# Patient Record
Sex: MALE | Race: WHITE
[De-identification: names, ages, dates, MRNs, and addresses within clinical notes are randomized per-mention and may not be internally consistent; named-entity substitution may affect disease eponyms.]

---

## 2020-05-10 ENCOUNTER — TRANSCRIPTION ENCOUNTER (OUTPATIENT)
Age: 70
End: 2020-05-10

## 2020-10-30 DIAGNOSIS — Z78.9 OTHER SPECIFIED HEALTH STATUS: ICD-10-CM

## 2020-10-30 DIAGNOSIS — Z83.3 FAMILY HISTORY OF DIABETES MELLITUS: ICD-10-CM

## 2020-10-30 DIAGNOSIS — I10 ESSENTIAL (PRIMARY) HYPERTENSION: ICD-10-CM

## 2020-11-02 ENCOUNTER — APPOINTMENT (OUTPATIENT)
Dept: UROLOGY | Facility: CLINIC | Age: 70
End: 2020-11-02
Payer: MEDICARE

## 2020-11-02 VITALS
BODY MASS INDEX: 30.53 KG/M2 | SYSTOLIC BLOOD PRESSURE: 113 MMHG | TEMPERATURE: 98 F | OXYGEN SATURATION: 98 % | DIASTOLIC BLOOD PRESSURE: 75 MMHG | HEART RATE: 74 BPM | WEIGHT: 190 LBS | HEIGHT: 66 IN

## 2020-11-02 DIAGNOSIS — Z87.891 PERSONAL HISTORY OF NICOTINE DEPENDENCE: ICD-10-CM

## 2020-11-02 LAB
BILIRUB UR QL STRIP: NORMAL
CLARITY UR: CLEAR
COLLECTION METHOD: NORMAL
GLUCOSE UR-MCNC: NORMAL
HCG UR QL: 0.2 EU/DL
HGB UR QL STRIP.AUTO: NORMAL
KETONES UR-MCNC: NORMAL
LEUKOCYTE ESTERASE UR QL STRIP: NORMAL
NITRITE UR QL STRIP: NORMAL
PH UR STRIP: 6.5
PROT UR STRIP-MCNC: NORMAL
SP GR UR STRIP: 1.01

## 2020-11-02 PROCEDURE — 76857 US EXAM PELVIC LIMITED: CPT

## 2020-11-02 PROCEDURE — 99215 OFFICE O/P EST HI 40 MIN: CPT | Mod: 25

## 2020-11-02 PROCEDURE — 99072 ADDL SUPL MATRL&STAF TM PHE: CPT

## 2020-11-02 PROCEDURE — 81003 URINALYSIS AUTO W/O SCOPE: CPT | Mod: QW

## 2020-11-02 RX ORDER — LOSARTAN POTASSIUM 50 MG/1
50 TABLET, FILM COATED ORAL
Refills: 0 | Status: ACTIVE | COMMUNITY

## 2020-11-02 NOTE — ASSESSMENT
[FreeTextEntry1] : I discussed the findings and options with Mr. DONIS JAMES in detail and reviewed the renal sonogram and PSA.\par \par Mr. James will continue with the combination therapy for his voiding symptoms.\par \par Providing there are no new problems, I look forward to seeing him in one year (bladder sono, renal sono, PSA). \par

## 2020-11-02 NOTE — REVIEW OF SYSTEMS
[see HPI] : see HPI [Initiating Urination Req. Strain] : initiating urination requires straining [Wake up at night to urinate  How many times?  ___] : wakes up to urinate [unfilled] times during the night [Negative] : Heme/Lymph [FreeTextEntry4] : , frequency

## 2020-11-02 NOTE — ADDENDUM
[FreeTextEntry1] : A portion of this note was written by [Russel Mobley] on 10/30/2020 acting as a scribe for Dr. Marks.\par \par I have personally reviewed the chart and agree that the record accurately reflects my personal performance of the history, physical exam, assessment and plan.

## 2020-11-02 NOTE — HISTORY OF PRESENT ILLNESS
[FreeTextEntry1] : Mr. DONIS JAMES comes in today for his urologic follow-up.  He presents with moderate lower urinary tract symptoms (obstructive and irritative) with nocturia x 3.  He is continuing on Flomax 0.4 mg and Proscar.\par IPSS: 9/35\par Sono: 33cc PVR; 45cc prostate\par \par Mr. James has a 40+ year onset of recurrent urolithiasis and has had 4 ESWL procedures and one ureteroscopy all prior to 2012.  He has been asymptomatic for over 10 years.\par \par He also had a prostate biopsy in 2011 which was complicated by urosepsis.  (Results not available)\par \par Mr James reports normal erections.\par \par PSA: 7/22/20--1.5; 7/15/19--1.7; 6/25/18--1.3; 10/13/17--1.5; 10/14/15--2.0\par \par Renal sono: \par 10/26/20--stable 2.3 cm left lower pole stone.  Bilateral renal cysts.\par 9/27/19--2.3cm left lower pole stone. Bilat renal cysts\par 9/14/12--1.3 cm left and 2 right renal stones (7+4mm)\par \par KUB: 9/27/19--Stable Lt lower pole renal stone (2.3 x 2.1cm)\par 10/5/18--Stable 2.4cm Lt lower pole stone\par \par FISH: 12/3/18--Negative\par Cytology: 10/17/18--Negative; 10/13/17--Atypia

## 2020-11-19 ENCOUNTER — APPOINTMENT (OUTPATIENT)
Dept: ORTHOPEDIC SURGERY | Facility: CLINIC | Age: 70
End: 2020-11-19
Payer: MEDICARE

## 2020-11-19 DIAGNOSIS — M25.561 PAIN IN RIGHT KNEE: ICD-10-CM

## 2020-11-19 PROCEDURE — 99204 OFFICE O/P NEW MOD 45 MIN: CPT | Mod: 25

## 2020-11-19 PROCEDURE — 73562 X-RAY EXAM OF KNEE 3: CPT | Mod: RT

## 2020-11-19 PROCEDURE — 20611 DRAIN/INJ JOINT/BURSA W/US: CPT | Mod: RT

## 2020-11-19 RX ORDER — HYALURONATE SODIUM 30 MG/2 ML
30 SYRINGE (ML) INTRAARTICULAR
Qty: 3 | Refills: 0 | Status: ACTIVE | OUTPATIENT
Start: 2020-11-19

## 2020-11-19 NOTE — PHYSICAL EXAM
[de-identified] : Right knee definite medial joint line tenderness is mild varus inclination range of motion is 5-125°. Quad tone is good no evidence of any instability. Neurovascular intact distally. [de-identified] : AP standing and lateral sunrise views were obtained showing near complete cartilage loss in the medial compartment of the right knee on the standing AP projections.

## 2020-11-19 NOTE — PROCEDURE
[de-identified] : Patient was given a cortisone injection into the lateral compartment of the right knee today. This demonstrated a condition that ultrasound guidance. Patient tolerated the procedure well.

## 2020-11-19 NOTE — DISCUSSION/SUMMARY
[de-identified] : We talked about options patient would like to proceed with Orthovisc injections we'll order them and notify them once they are available for use. We'll be touched upon the subject of possible knee replacement surgery in the future if conservative measures fail

## 2020-11-19 NOTE — HISTORY OF PRESENT ILLNESS
[de-identified] : First time visit for this patient is seen with complaint of right knee pain. Apparently he had similar pain in his left knee a few years ago and was given a cortisone injection and Orthovisc injections by another physician and has been well ever since. Right knee pain is ongoing for at least 3 months she recalls no specific accident injury but now getting in and out of the seated position as well as going up and down stairs quite painful.

## 2021-02-12 ENCOUNTER — APPOINTMENT (OUTPATIENT)
Dept: ORTHOPEDIC SURGERY | Facility: CLINIC | Age: 71
End: 2021-02-12

## 2021-04-01 ENCOUNTER — APPOINTMENT (OUTPATIENT)
Dept: ORTHOPEDIC SURGERY | Facility: CLINIC | Age: 71
End: 2021-04-01
Payer: MEDICARE

## 2021-04-01 PROCEDURE — 99214 OFFICE O/P EST MOD 30 MIN: CPT | Mod: 25

## 2021-04-01 PROCEDURE — 99072 ADDL SUPL MATRL&STAF TM PHE: CPT

## 2021-04-01 PROCEDURE — 20611 DRAIN/INJ JOINT/BURSA W/US: CPT | Mod: RT

## 2021-04-01 NOTE — PROCEDURE
[de-identified] : Injection 1/3\par Patient was given an Euflexxa injection 30mg/2ml  in the lateral compartment of the knee. Injection is performed under sterile conditions with ultrasound guidance. Patient tolerated procedure well. \par \par

## 2021-04-01 NOTE — PHYSICAL EXAM
[de-identified] : Right knee definite medial joint line tenderness is mild varus inclination range of motion is 5-125°. Quad tone is good no evidence of any instability. Neurovascular intact distally. \par

## 2021-04-08 ENCOUNTER — APPOINTMENT (OUTPATIENT)
Dept: ORTHOPEDIC SURGERY | Facility: CLINIC | Age: 71
End: 2021-04-08
Payer: MEDICARE

## 2021-04-08 PROCEDURE — 99072 ADDL SUPL MATRL&STAF TM PHE: CPT

## 2021-04-08 PROCEDURE — 20611 DRAIN/INJ JOINT/BURSA W/US: CPT | Mod: RT

## 2021-04-08 PROCEDURE — 99214 OFFICE O/P EST MOD 30 MIN: CPT | Mod: 25

## 2021-04-08 NOTE — HISTORY OF PRESENT ILLNESS
[de-identified] : Patient is here for a second of 3 right knee flexor injections. He states he feels some mild improvement already.

## 2021-04-08 NOTE — PROCEDURE
[de-identified] : Patient was given the second of a series of 3 units Euflexxa injections today. This was done under sterile conditions ultrasound guidance patient tolerated the procedure well

## 2021-04-08 NOTE — PHYSICAL EXAM
[de-identified] : Right knee definite medial joint line tenderness is mild varus inclination range of motion is 5-125°. Quad tone is good no evidence of any instability. Neurovascular intact distally.

## 2021-04-15 ENCOUNTER — APPOINTMENT (OUTPATIENT)
Dept: ORTHOPEDIC SURGERY | Facility: CLINIC | Age: 71
End: 2021-04-15
Payer: MEDICARE

## 2021-04-15 PROCEDURE — 99214 OFFICE O/P EST MOD 30 MIN: CPT | Mod: 25

## 2021-04-15 PROCEDURE — 99072 ADDL SUPL MATRL&STAF TM PHE: CPT

## 2021-04-15 PROCEDURE — 20611 DRAIN/INJ JOINT/BURSA W/US: CPT | Mod: RT

## 2021-04-15 NOTE — HISTORY OF PRESENT ILLNESS
[de-identified] : Returns today for her third and final right knee Euflexxa injection patient states he feels moderate improvement thus far.

## 2021-04-15 NOTE — PHYSICAL EXAM
[de-identified] : Right knee definite medial joint line tenderness is mild varus inclination range of motion is 5-125°. Quad tone is good no evidence of any instability. Neurovascular intact distally.

## 2021-04-15 NOTE — PROCEDURE
[de-identified] : Patient was given the third of a series of 3 units Euflexxa injections today. This was done under sterile conditions ultrasound guidance patient tolerated the procedure well

## 2021-05-20 DIAGNOSIS — M17.11 UNILATERAL PRIMARY OSTEOARTHRITIS, RIGHT KNEE: ICD-10-CM

## 2021-05-20 RX ORDER — HYALURONATE SODIUM 20 MG/2 ML
20 SYRINGE (ML) INTRAARTICULAR
Qty: 3 | Refills: 0 | Status: ACTIVE | OUTPATIENT
Start: 2021-05-20

## 2021-10-22 ENCOUNTER — RX RENEWAL (OUTPATIENT)
Age: 71
End: 2021-10-22

## 2021-11-10 ENCOUNTER — APPOINTMENT (OUTPATIENT)
Dept: UROLOGY | Facility: CLINIC | Age: 71
End: 2021-11-10
Payer: MEDICARE

## 2021-11-10 PROCEDURE — 99214 OFFICE O/P EST MOD 30 MIN: CPT

## 2021-11-10 PROCEDURE — 51798 US URINE CAPACITY MEASURE: CPT

## 2021-11-11 NOTE — ASSESSMENT
[FreeTextEntry1] : I discussed the findings and options with Mr. DONIS JAMES in detail.  He is satisfied with his urinary symptoms and will simply continue with the tamsulosin 0.4mg and finasteride 5mg.  I also reviewed behavioral modification. \par \par A renal sonogram was ordered and I will call Mr. James with the result. \par \par Providing there are no new problems, I look forward to seeing Mr. James in one year (bladder sono, renal sono, PSA).

## 2021-11-11 NOTE — HISTORY OF PRESENT ILLNESS
[FreeTextEntry1] : Mr. DONIS JAMES comes in today for his annual urologic follow-up.  He presents with moderate lower urinary tract symptoms (obstructive and irritative) with nocturia x 3.  He is continuing on Flomax 0.4 mg and Proscar.\par IPSS: 18/35\par Sono: 152cc PVR\par \par Mr. James has a 40+ year onset of recurrent urolithiasis and has had 4 ESWL procedures and one ureteroscopy all prior to 2012.  He has been asymptomatic for over 10 years.\par \par He also had a prostate biopsy in 2011 which was complicated by urosepsis.  (Results not available)\par \par Mr James reports normal erections.\par \par PSA: 7/21/21--1.4; 7/22/20--1.5; 7/15/19--1.7; 6/25/18--1.3; 10/13/17--1.5; 10/14/15--2.0\par \par Renal sono: 10/26/20--stable 2.3 cm left lower pole stone.  Bilateral renal cysts; 9/27/19--2.3cm left lower pole stone. Bilat renal cysts; 9/14/12--1.3 cm left and 2 right renal stones (7+4mm)\par \par KUB: 9/27/19--Stable Lt lower pole renal stone (2.3 x 2.1cm); 10/5/18--Stable 2.4cm Lt lower pole stone\par \par FISH: 12/3/18--Negative\par Cytology: 10/17/18--Negative; 10/13/17--Atypia

## 2021-11-11 NOTE — PHYSICAL EXAM
[General Appearance - Well Developed] : well developed [General Appearance - Well Nourished] : well nourished [Normal Appearance] : normal appearance [Well Groomed] : well groomed [General Appearance - In No Acute Distress] : no acute distress [Abdomen Soft] : soft [Abdomen Tenderness] : non-tender [Costovertebral Angle Tenderness] : no ~M costovertebral angle tenderness [Urethral Meatus] : meatus normal [Urinary Bladder Findings] : the bladder was normal on palpation [Scrotum] : the scrotum was normal [Testes Mass (___cm)] : there were no testicular masses [No Prostate Nodules] : no prostate nodules [Edema] : no peripheral edema [] : no respiratory distress [Respiration, Rhythm And Depth] : normal respiratory rhythm and effort [Exaggerated Use Of Accessory Muscles For Inspiration] : no accessory muscle use [Oriented To Time, Place, And Person] : oriented to person, place, and time [Affect] : the affect was normal [Mood] : the mood was normal [Not Anxious] : not anxious [Normal Station and Gait] : the gait and station were normal for the patient's age [No Focal Deficits] : no focal deficits [No Palpable Adenopathy] : no palpable adenopathy [Abdomen Mass (___ Cm)] : no abdominal mass palpated [Abdomen Hernia] : no hernia was discovered [Penis Abnormality] : normal uncircumcised penis [Epididymis] : the epididymides were normal [Testes Tenderness] : no tenderness of the testes [Prostate Tenderness] : the prostate was not tender [Skin Color & Pigmentation] : normal skin color and pigmentation [FreeTextEntry1] : Bilateral hydroceles

## 2021-11-11 NOTE — ADDENDUM
[FreeTextEntry1] : A portion of this note was written by [Michael Sow] on 11/09/2021 acting as a scribe for Dr. Marks. \par \par I have personally reviewed the chart and agree that the record accurately reflects my personal performance of the history, physical exam, assessment, and plan.

## 2021-11-30 ENCOUNTER — NON-APPOINTMENT (OUTPATIENT)
Age: 71
End: 2021-11-30

## 2022-11-16 ENCOUNTER — LABORATORY RESULT (OUTPATIENT)
Age: 72
End: 2022-11-16

## 2022-11-16 ENCOUNTER — APPOINTMENT (OUTPATIENT)
Dept: UROLOGY | Facility: CLINIC | Age: 72
End: 2022-11-16

## 2022-11-16 PROCEDURE — 99214 OFFICE O/P EST MOD 30 MIN: CPT

## 2022-11-16 PROCEDURE — 51798 US URINE CAPACITY MEASURE: CPT

## 2022-11-16 RX ORDER — FLUTICASONE PROPIONATE 50 UG/1
50 SPRAY, METERED NASAL
Qty: 16 | Refills: 0 | Status: ACTIVE | COMMUNITY
Start: 2022-10-08

## 2022-11-16 RX ORDER — AMLODIPINE BESYLATE 5 MG/1
5 TABLET ORAL
Qty: 90 | Refills: 0 | Status: ACTIVE | COMMUNITY
Start: 2022-04-12

## 2022-11-16 NOTE — ASSESSMENT
[FreeTextEntry1] : I discussed the findings and options with . DONIS JAMES in detail.  He is satisfied with his voiding pattern and will simply continue on the tamsulosin 0.4mg and finasteride 5mg.  I also reviewed behavioral modification. \par \par I have asked him to obtain a renal sonogram due to the history of urolithiasis and the recurrent microhematuria and will call him once I see the result. A culture and cytology are pending as well. \par \par Providing there are no new problems, I look forward to seeing Mr. James in one year (bladder sono, PSA). He should again have a renal sonogram in one year, prior to his visit.

## 2022-11-16 NOTE — ADDENDUM
[FreeTextEntry1] : A portion of this note was written by [Michael Sow] on 11/15/2022 acting as a scribe for Dr. Marks. \par \par I have personally reviewed the chart and agree that the record accurately reflects my personal performance of the history, physical exam, assessment, and plan.

## 2022-11-16 NOTE — PHYSICAL EXAM
[General Appearance - Well Developed] : well developed [General Appearance - Well Nourished] : well nourished [Normal Appearance] : normal appearance [Well Groomed] : well groomed [General Appearance - In No Acute Distress] : no acute distress [Abdomen Soft] : soft [Abdomen Tenderness] : non-tender [Abdomen Mass (___ Cm)] : no abdominal mass palpated [Abdomen Hernia] : no hernia was discovered [Costovertebral Angle Tenderness] : no ~M costovertebral angle tenderness [Urethral Meatus] : meatus normal [Penis Abnormality] : normal uncircumcised penis [Urinary Bladder Findings] : the bladder was normal on palpation [Scrotum] : the scrotum was normal [Epididymis] : the epididymides were normal [Testes Tenderness] : no tenderness of the testes [Testes Mass (___cm)] : there were no testicular masses [Prostate Tenderness] : the prostate was not tender [No Prostate Nodules] : no prostate nodules [Skin Color & Pigmentation] : normal skin color and pigmentation [] : no respiratory distress [Respiration, Rhythm And Depth] : normal respiratory rhythm and effort [Exaggerated Use Of Accessory Muscles For Inspiration] : no accessory muscle use [Oriented To Time, Place, And Person] : oriented to person, place, and time [Affect] : the affect was normal [Mood] : the mood was normal [Not Anxious] : not anxious [Normal Station and Gait] : the gait and station were normal for the patient's age [No Focal Deficits] : no focal deficits [No Palpable Adenopathy] : no palpable adenopathy [FreeTextEntry1] : Bilateral LE edema

## 2022-11-16 NOTE — HISTORY OF PRESENT ILLNESS
[FreeTextEntry1] : Mr. DONIS JAMES comes in today for his annual urologic follow-up.  He presents with moderate stable lower urinary tract symptoms (obstructive and irritative) and nocturia x 3.  He is continuing on Flomax 0.4 mg and Proscar.\par IPSS: 5/35\par Sono (performed to assess bladder emptying): 11cc PVR\par \par Mr. James has a 40+ year onset of recurrent urolithiasis and has had 4 ESWL procedures and one ureteroscopy all prior to 2012 (with the late Dr. Ifeanyi Steinberg at [facility]).  He has been asymptomatic for over 10 years. \par \par Mr. James has a >30 year history of asymptomatic microhematuria and does not want this evaluated.\par \par He also had a prostate biopsy in 2011 which was complicated by urosepsis.  (Results not available)\par \par Mr James reports normal erections.\par \par PSA: 9/14/22--1.46; 7/21/21--1.4; 7/22/20--1.5; 7/15/19--1.7; 6/25/18--1.3; 10/13/17--1.5; 10/14/15--2.0\par \par Renal sono: 11/29/21--No hydronephrosis in either kidney. 2.5cm shadowing calcification in the left lower pole. Prostate 95cc. PVR 60cc; 10/26/20--stable 2.3 cm left lower pole stone.  Bilateral renal cysts; 9/27/19--2.3cm left lower pole stone. Bilat renal cysts; 9/14/12--1.3 cm left and 2 right renal stones (7+4mm)\par \par KUB: 9/27/19--Stable Lt lower pole renal stone (2.3 x 2.1cm); 10/5/18--Stable 2.4cm Lt lower pole stone\par \par FISH: 12/3/18--Negative\par Cytology: 10/17/18--Negative; 10/13/17--Atypia\par Urinalysis: 9/14/22--No RBCs;

## 2022-11-21 ENCOUNTER — NON-APPOINTMENT (OUTPATIENT)
Age: 72
End: 2022-11-21

## 2022-12-02 ENCOUNTER — NON-APPOINTMENT (OUTPATIENT)
Age: 72
End: 2022-12-02

## 2023-02-08 ENCOUNTER — LABORATORY RESULT (OUTPATIENT)
Age: 73
End: 2023-02-08

## 2023-02-08 ENCOUNTER — APPOINTMENT (OUTPATIENT)
Dept: UROLOGY | Facility: CLINIC | Age: 73
End: 2023-02-08
Payer: MEDICARE

## 2023-02-08 VITALS
WEIGHT: 194 LBS | TEMPERATURE: 97.1 F | HEART RATE: 99 BPM | SYSTOLIC BLOOD PRESSURE: 143 MMHG | DIASTOLIC BLOOD PRESSURE: 80 MMHG | HEIGHT: 66 IN | BODY MASS INDEX: 31.18 KG/M2 | RESPIRATION RATE: 14 BRPM | OXYGEN SATURATION: 96 %

## 2023-02-08 DIAGNOSIS — Z87.898 PERSONAL HISTORY OF OTHER SPECIFIED CONDITIONS: ICD-10-CM

## 2023-02-08 PROCEDURE — 51798 US URINE CAPACITY MEASURE: CPT

## 2023-02-08 PROCEDURE — 99214 OFFICE O/P EST MOD 30 MIN: CPT

## 2023-02-08 NOTE — ADDENDUM
[FreeTextEntry1] : A portion of this note was written by [Michael Sow] on 02/07/2023 acting as a scribe for Dr. Marks. \par \par I have personally reviewed the chart and agree that the record accurately reflects my personal performance of the history, physical exam, assessment, and plan.

## 2023-02-08 NOTE — PHYSICAL EXAM
[General Appearance - Well Developed] : well developed [General Appearance - Well Nourished] : well nourished [Normal Appearance] : normal appearance [Well Groomed] : well groomed [General Appearance - In No Acute Distress] : no acute distress [Abdomen Soft] : soft [Abdomen Tenderness] : non-tender [Abdomen Hernia] : no hernia was discovered [Abdomen Mass (___ Cm)] : no abdominal mass palpated [Costovertebral Angle Tenderness] : no ~M costovertebral angle tenderness [Urethral Meatus] : meatus normal [Penis Abnormality] : normal uncircumcised penis [Urinary Bladder Findings] : the bladder was normal on palpation [Scrotum] : the scrotum was normal [Epididymis] : the epididymides were normal [Testes Tenderness] : no tenderness of the testes [Testes Mass (___cm)] : there were no testicular masses [Prostate Tenderness] : the prostate was not tender [No Prostate Nodules] : no prostate nodules [Skin Color & Pigmentation] : normal skin color and pigmentation [] : no respiratory distress [Respiration, Rhythm And Depth] : normal respiratory rhythm and effort [Exaggerated Use Of Accessory Muscles For Inspiration] : no accessory muscle use [Oriented To Time, Place, And Person] : oriented to person, place, and time [Affect] : the affect was normal [Mood] : the mood was normal [Not Anxious] : not anxious [Normal Station and Gait] : the gait and station were normal for the patient's age [No Focal Deficits] : no focal deficits [No Palpable Adenopathy] : no palpable adenopathy [FreeTextEntry1] : Bilateral LE edema

## 2023-02-08 NOTE — ASSESSMENT
[FreeTextEntry1] : I discussed the findings and options with Mr. DONIS JAMES in detail. \par \par Regarding the solitary episode of hematospermia, Mr. James had a PSA drawn today and we will call him with the result. Should the hematospermia persist for >6 months, a prostate MRI may be indicated. \par \par He is satisfied with his voiding pattern and will simply continue on the tamsulosin 0.4mg and finasteride 5mg.\par \par A urine culture and cytology are pending due to the persistent microhematuria (which may be related to the known urolithiasis). He does not want this further evaluated.\par \par Providing there are no new problems, I look forward to seeing Mr. James in one year (bladder sono, PSA). He should again have a renal sonogram in one year, prior to his visit.

## 2023-02-08 NOTE — HISTORY OF PRESENT ILLNESS
[FreeTextEntry1] : Mr. DONIS JAMES comes in today for urgent follow-up.  One month ago, he had a single episode of asymptomatic hematospermia which has not recurred. \par \par From his general urologic history Mr. James reports moderate stable lower urinary tract symptoms (obstructive and irritative) and nocturia x 3.  He is continuing on Flomax 0.4 mg and Proscar.\par IPSS: 9/35\par Sono (performed to assess bladder emptying): 8cc PVR\par \par Mr. James has a 40+ year onset of recurrent urolithiasis and has had 4 ESWL procedures and one ureteroscopy all prior to 2012 (with the late Dr. Ifeanyi Steinbreg at [facility]).  He has been asymptomatic for over 10 years. A recent renal ultrasound identified a stable 1.9cm nonobstructing left lower pole renal stone. \par \par Mr. James has a >30 year history of asymptomatic microhematuria and does not want this evaluated.\par \par He also had a prostate biopsy in 2011 which was complicated by urosepsis.  (Results not available)\par \par Mr James reports normal erections.\par \par PSA: 9/14/22--1.46; 7/21/21--1.4; 7/22/20--1.5; 7/15/19--1.7; 6/25/18--1.3; 10/13/17--1.5; 10/14/15--2.0\par \par Renal sono: 11/30/22--Enlarged prostate. 1.9cm nonobstructing left lower pole renal stone. No hydronephrosis; 11/29/21--No hydronephrosis in either kidney. 2.5cm shadowing calcification in the left lower pole. Prostate 95cc. PVR 60cc; 10/26/20--stable 2.3 cm left lower pole stone.  Bilateral renal cysts; 9/27/19--2.3cm left lower pole stone. Bilat renal cysts; 9/14/12--1.3 cm left and 2 right renal stones (7+4mm)\par \par KUB: 9/27/19--Stable Lt lower pole renal stone (2.3 x 2.1cm); 10/5/18--Stable 2.4cm Lt lower pole stone\par \par FISH: 12/3/18--Negative\par Cytology: 10/17/18--Negative; 10/13/17--Atypia\par Urinalysis: 9/14/22--No RBCs;

## 2023-02-10 ENCOUNTER — NON-APPOINTMENT (OUTPATIENT)
Age: 73
End: 2023-02-10

## 2023-10-10 ENCOUNTER — RX RENEWAL (OUTPATIENT)
Age: 73
End: 2023-10-10

## 2023-10-10 RX ORDER — TAMSULOSIN HYDROCHLORIDE 0.4 MG/1
0.4 CAPSULE ORAL
Qty: 90 | Refills: 3 | Status: ACTIVE | COMMUNITY
Start: 2023-09-28 | End: 1900-01-01

## 2023-12-02 PROBLEM — N20.9 UROLITHIASIS: Status: ACTIVE | Noted: 2020-10-30

## 2023-12-02 PROBLEM — R39.9 LOWER URINARY TRACT SYMPTOMS (LUTS): Status: ACTIVE | Noted: 2020-10-30

## 2023-12-02 PROBLEM — Z00.00 ENCOUNTER FOR PREVENTIVE HEALTH EXAMINATION: Status: ACTIVE | Noted: 2019-12-06

## 2023-12-02 PROBLEM — R31.29 MICROHEMATURIA: Status: ACTIVE | Noted: 2023-02-08

## 2023-12-02 PROBLEM — R36.1 HEMATOSPERMIA: Status: RESOLVED | Noted: 2023-02-08 | Resolved: 2023-12-02

## 2023-12-04 ENCOUNTER — APPOINTMENT (OUTPATIENT)
Dept: UROLOGY | Facility: CLINIC | Age: 73
End: 2023-12-04
Payer: MEDICARE

## 2023-12-04 VITALS — DIASTOLIC BLOOD PRESSURE: 74 MMHG | SYSTOLIC BLOOD PRESSURE: 167 MMHG | HEART RATE: 68 BPM | OXYGEN SATURATION: 95 %

## 2023-12-04 DIAGNOSIS — R39.9 UNSPECIFIED SYMPTOMS AND SIGNS INVOLVING THE GENITOURINARY SYSTEM: ICD-10-CM

## 2023-12-04 DIAGNOSIS — R31.29 OTHER MICROSCOPIC HEMATURIA: ICD-10-CM

## 2023-12-04 DIAGNOSIS — R36.1 HEMATOSPERMIA: ICD-10-CM

## 2023-12-04 DIAGNOSIS — Z00.00 ENCOUNTER FOR GENERAL ADULT MEDICAL EXAMINATION W/OUT ABNORMAL FINDINGS: ICD-10-CM

## 2023-12-04 DIAGNOSIS — N20.9 URINARY CALCULUS, UNSPECIFIED: ICD-10-CM

## 2023-12-04 PROCEDURE — 76775 US EXAM ABDO BACK WALL LIM: CPT

## 2023-12-04 PROCEDURE — 76857 US EXAM PELVIC LIMITED: CPT | Mod: 59

## 2023-12-04 PROCEDURE — 99215 OFFICE O/P EST HI 40 MIN: CPT | Mod: 25

## 2023-12-04 RX ORDER — FINASTERIDE 5 MG/1
5 TABLET, FILM COATED ORAL
Qty: 90 | Refills: 3 | Status: DISCONTINUED | COMMUNITY
Start: 2021-10-22 | End: 2023-12-04

## 2023-12-04 RX ORDER — FINASTERIDE 5 MG/1
5 TABLET, FILM COATED ORAL
Qty: 90 | Refills: 3 | Status: ACTIVE | COMMUNITY
Start: 2023-09-27 | End: 1900-01-01

## 2023-12-04 RX ORDER — TAMSULOSIN HYDROCHLORIDE 0.4 MG/1
0.4 CAPSULE ORAL
Qty: 90 | Refills: 3 | Status: ACTIVE | COMMUNITY
Start: 2021-10-22 | End: 1900-01-01

## 2023-12-05 LAB — PSA SERPL-MCNC: 1.64 NG/ML

## 2023-12-06 ENCOUNTER — NON-APPOINTMENT (OUTPATIENT)
Age: 73
End: 2023-12-06

## 2024-10-04 ENCOUNTER — APPOINTMENT (OUTPATIENT)
Dept: UROLOGY | Facility: CLINIC | Age: 74
End: 2024-10-04
Payer: MEDICARE

## 2024-10-04 DIAGNOSIS — R39.9 UNSPECIFIED SYMPTOMS AND SIGNS INVOLVING THE GENITOURINARY SYSTEM: ICD-10-CM

## 2024-10-04 DIAGNOSIS — R36.1 HEMATOSPERMIA: ICD-10-CM

## 2024-10-04 DIAGNOSIS — N20.9 URINARY CALCULUS, UNSPECIFIED: ICD-10-CM

## 2024-10-04 DIAGNOSIS — R31.29 OTHER MICROSCOPIC HEMATURIA: ICD-10-CM

## 2024-10-04 PROCEDURE — 51798 US URINE CAPACITY MEASURE: CPT

## 2024-10-04 PROCEDURE — 99215 OFFICE O/P EST HI 40 MIN: CPT | Mod: 25

## 2024-10-04 NOTE — HISTORY OF PRESENT ILLNESS
[FreeTextEntry1] : Mr. DONIS JAMES returns for his annual followup. He presents with moderate stable lower urinary tract symptoms (obstructive and irritative) and nocturia x 3.  He is continuing on Flomax 0.4 mg and Proscar, which he feels have been effective. IPSS: 12/2 Sono (performed to assess bladder emptying): 37cc prostate  For the past 6 months he has been experiencing intermittent hematospermia.  This occurred previously approximately 2 years ago and spontaneously resolved.  Mr. James has a 40+ year onset of recurrent urolithiasis and has had 4 ESWL procedures and one ureteroscopy all prior to 2012 (with the late Dr. Ifeanyi Steinberg at [facility]).  He has been asymptomatic for over 10 years. A recent renal ultrasound identified a stable 1.9cm nonobstructing left lower pole renal stone.   Mr. James has a >30 year history of asymptomatic microhematuria and does not want this evaluated.  He also had a prostate biopsy in 2011 which was complicated by urosepsis.  (Results not available)  Mr James reports normal erections.  PSA: 12/4/23--1.64, 7/19/23--1.32; 2/9/23--1.65; 9/14/22--1.46; 7/21/21--1.4; 7/22/20--1.5; 7/15/19--1.7; 6/25/18--1.3; 10/13/17--1.5; 10/14/15--2.0  Renal sono: 12/4/23--1.8 cm left lower pole stone.  4.4 cm left renal cyst.  2 large right simple cysts centimeters 8.7 cm and 6.8 cm; 11/30/22--Enlarged prostate. 1.9cm nonobstructing left lower pole renal stone. No hydronephrosis; 11/29/21--No hydronephrosis in either kidney. 2.5cm shadowing calcification in the left lower pole. Prostate 95cc. PVR 60cc; 10/26/20--stable 2.3 cm left lower pole stone.  Bilateral renal cysts; 9/27/19--2.3cm left lower pole stone. Bilat renal cysts; 9/14/12--1.3 cm left and 2 right renal stones (7+4mm)  KUB: 9/27/19--Stable Lt lower pole renal stone (2.3 x 2.1cm); 10/5/18--Stable 2.4cm Lt lower pole stone  FISH: 12/3/18--Negative Cytology: 10/17/18--Negative; 10/13/17--Atypia Urinalysis: 9/14/22--No RBCs;

## 2024-10-04 NOTE — ASSESSMENT
[FreeTextEntry1] : I discussed the findings and options with Mr. DONIS JAMES in detail. He is satisfied with his urination and will simply continue on the combination therapy, which I have renewed.  A prostate MRI and renal sonogram were ordered to evaluate the recurrent hematospermia and urolithiasis, respectively.   Providing there are no new problems and the PSA and imaging tests are unchanged, Mr. James will followup in 1 year (bladder sono, renal sono, PSA).

## 2024-10-04 NOTE — LETTER BODY
[Dear  ___] : Dear  [unfilled], [Consult Letter:] : I had the pleasure of evaluating your patient, [unfilled]. [Please see my note below.] : Please see my note below. [Consult Closing:] : Thank you very much for allowing me to participate in the care of this patient.  If you have any questions, please do not hesitate to contact me. [Sincerely,] : Sincerely, [FreeTextEntry3] : Pete Marks MD, FACS

## 2024-10-04 NOTE — HISTORY OF PRESENT ILLNESS
[FreeTextEntry1] : Mr. DONIS JAMES returns for his annual followup. He presents with moderate stable lower urinary tract symptoms (obstructive and irritative) and nocturia x 3.  He is continuing on Flomax 0.4 mg and Proscar, which he feels have been effective. IPSS: 12/2 Sono (performed to assess bladder emptying): 37cc prostate  For the past 6 months he has been experiencing intermittent hematospermia.  This occurred previously approximately 2 years ago and spontaneously resolved.  Mr. James has a 40+ year onset of recurrent urolithiasis and has had 4 ESWL procedures and one ureteroscopy all prior to 2012 (with the late Dr. Ifeanyi Steinberg at [facility]).  He has been asymptomatic for over 10 years. A recent renal ultrasound identified a stable 1.9cm nonobstructing left lower pole renal stone.   Mr. James has a >30 year history of asymptomatic microhematuria and does not want this evaluated.  He also had a prostate biopsy in 2011 which was complicated by urosepsis.  (Results not available)  Mr James reports normal erections.  PSA: 12/4/23--1.64, 7/19/23--1.32; 2/9/23--1.65; 9/14/22--1.46; 7/21/21--1.4; 7/22/20--1.5; 7/15/19--1.7; 6/25/18--1.3; 10/13/17--1.5; 10/14/15--2.0  Renal sono: 12/4/23--1.8 cm left lower pole stone.  4.4 cm left renal cyst.  2 large right simple cysts centimeters 8.7 cm and 6.8 cm; 11/30/22--Enlarged prostate. 1.9cm nonobstructing left lower pole renal stone. No hydronephrosis; 11/29/21--No hydronephrosis in either kidney. 2.5cm shadowing calcification in the left lower pole. Prostate 95cc. PVR 60cc; 10/26/20--stable 2.3 cm left lower pole stone.  Bilateral renal cysts; 9/27/19--2.3cm left lower pole stone. Bilat renal cysts; 9/14/12--1.3 cm left and 2 right renal stones (7+4mm)  KUB: 9/27/19--Stable Lt lower pole renal stone (2.3 x 2.1cm); 10/5/18--Stable 2.4cm Lt lower pole stone  FISH: 12/3/18--Negative Cytology: 10/17/18--Negative; 10/13/17--Atypia Urinalysis: 9/14/22--No RBCs;

## 2024-10-07 ENCOUNTER — NON-APPOINTMENT (OUTPATIENT)
Age: 74
End: 2024-10-07

## 2024-10-07 LAB — PSA SERPL-MCNC: 1.78 NG/ML

## 2024-10-28 ENCOUNTER — NON-APPOINTMENT (OUTPATIENT)
Age: 74
End: 2024-10-28